# Patient Record
Sex: FEMALE
[De-identification: names, ages, dates, MRNs, and addresses within clinical notes are randomized per-mention and may not be internally consistent; named-entity substitution may affect disease eponyms.]

---

## 2022-02-04 ENCOUNTER — APPOINTMENT (OUTPATIENT)
Dept: UROLOGY | Facility: CLINIC | Age: 57
End: 2022-02-04
Payer: COMMERCIAL

## 2022-02-04 VITALS
DIASTOLIC BLOOD PRESSURE: 86 MMHG | BODY MASS INDEX: 24.11 KG/M2 | HEART RATE: 78 BPM | HEIGHT: 66 IN | SYSTOLIC BLOOD PRESSURE: 129 MMHG | OXYGEN SATURATION: 96 % | WEIGHT: 150 LBS

## 2022-02-04 DIAGNOSIS — Z83.3 FAMILY HISTORY OF DIABETES MELLITUS: ICD-10-CM

## 2022-02-04 DIAGNOSIS — R30.0 DYSURIA: ICD-10-CM

## 2022-02-04 DIAGNOSIS — Z78.9 OTHER SPECIFIED HEALTH STATUS: ICD-10-CM

## 2022-02-04 DIAGNOSIS — Z80.3 FAMILY HISTORY OF MALIGNANT NEOPLASM OF BREAST: ICD-10-CM

## 2022-02-04 PROCEDURE — 99203 OFFICE O/P NEW LOW 30 MIN: CPT

## 2022-02-04 NOTE — HISTORY OF PRESENT ILLNESS
[FreeTextEntry1] : 55 yo F referred for microhematuria\par Went to gyn at the end of Nov for some dysuria\par Pt was told she had microhematuria but no bacteria\par Symptoms resolved and was doing well until last week\par Recurrence of vaginal discomfort and increase in urinary frequency and urgency\par Had episode of urge incontinence which is not usual for her\par No gross hematuria, no prior history of kidney stones\par Drinks 4 bottles of water, sometimes soda\par no children, not sexually active\par LMP = 10 yrs ago\par normal bowel movements\par [facility] - [facility] [place] - [phone]

## 2022-02-04 NOTE — ASSESSMENT
[FreeTextEntry1] : 57 yo F with dysuria, history of microhematuria\par \par - PVR = 0ml\par - Obtain labwork from gyn for review\par - UA, culture\par - Discussed possible etiologies for microhematuria including benign (UTI, nephrolithiasis, cyst, BPH) vs malignancy (renal, ureteral and bladder). Discussed hematuria workup which includes upper tract imaging such as US or CT urogram and cystoscopy. Discussed risk and benefits of cystoscopy.\par - Once microhematuria confirmed with neg culture, will proceed with workup

## 2022-02-11 LAB
APPEARANCE: ABNORMAL
BACTERIA UR CULT: ABNORMAL
BACTERIA: ABNORMAL
BILIRUBIN URINE: NEGATIVE
BLOOD URINE: ABNORMAL
CALCIUM OXALATE CRYSTALS: ABNORMAL
COLOR: ABNORMAL
GLUCOSE QUALITATIVE U: NEGATIVE
HYALINE CASTS: 24 /LPF
KETONES URINE: NEGATIVE
LEUKOCYTE ESTERASE URINE: ABNORMAL
MICROSCOPIC-UA: NORMAL
NITRITE URINE: NEGATIVE
PH URINE: 5.5
PROTEIN URINE: ABNORMAL
RED BLOOD CELLS URINE: 2 /HPF
SPECIFIC GRAVITY URINE: 1.03
SQUAMOUS EPITHELIAL CELLS: 6 /HPF
UROBILINOGEN URINE: NORMAL
WHITE BLOOD CELLS URINE: 15 /HPF

## 2022-03-11 ENCOUNTER — APPOINTMENT (OUTPATIENT)
Dept: UROLOGY | Facility: CLINIC | Age: 57
End: 2022-03-11
Payer: COMMERCIAL

## 2022-03-11 DIAGNOSIS — N39.0 URINARY TRACT INFECTION, SITE NOT SPECIFIED: ICD-10-CM

## 2022-03-11 PROCEDURE — 99213 OFFICE O/P EST LOW 20 MIN: CPT

## 2022-03-11 RX ORDER — SULFAMETHOXAZOLE AND TRIMETHOPRIM 800; 160 MG/1; MG/1
800-160 TABLET ORAL
Qty: 14 | Refills: 0 | Status: COMPLETED | COMMUNITY
Start: 2022-02-11 | End: 2022-03-11

## 2022-03-11 NOTE — HISTORY OF PRESENT ILLNESS
[FreeTextEntry1] : 57 yo F referred for microhematuria\par Went to gyn at the end of Nov for some dysuria\par Pt was told she had microhematuria but no bacteria\par Symptoms resolved and was doing well until last week\par Recurrence of vaginal discomfort and increase in urinary frequency and urgency\par Had episode of urge incontinence which is not usual for her\par No gross hematuria, no prior history of kidney stones\par Drinks 4 bottles of water, sometimes soda\par no children, not sexually active\par LMP = 10 yrs ago\par normal bowel movements\par [facility] - [facility] in [place] - [phone]\par \par 3/11/22 Interval history: Doing well since last visit\par Found to have a UTI and completed abx course\par Feeling much better with resolution of LUTS and vaginal discomfort\par No gross hematuria

## 2022-03-11 NOTE — ASSESSMENT
[FreeTextEntry1] : 55 yo F with microhematuria, hsitory of UTI\par \par - Repeat UA and culture today\par - If UTI or microhematuria persists, will consider renal US with cysto
oral

## 2022-03-11 NOTE — PHYSICAL EXAM

## 2022-03-14 LAB
APPEARANCE: ABNORMAL
BACTERIA UR CULT: NORMAL
BACTERIA: NEGATIVE
BILIRUBIN URINE: NEGATIVE
BLOOD URINE: ABNORMAL
COLOR: YELLOW
GLUCOSE QUALITATIVE U: NEGATIVE
HYALINE CASTS: 5 /LPF
KETONES URINE: ABNORMAL
LEUKOCYTE ESTERASE URINE: ABNORMAL
MICROSCOPIC-UA: NORMAL
NITRITE URINE: NEGATIVE
PH URINE: 6
PROTEIN URINE: ABNORMAL
RED BLOOD CELLS URINE: 7 /HPF
SPECIFIC GRAVITY URINE: 1.03
SQUAMOUS EPITHELIAL CELLS: 2 /HPF
UROBILINOGEN URINE: NORMAL
WHITE BLOOD CELLS URINE: 4 /HPF

## 2022-03-15 ENCOUNTER — NON-APPOINTMENT (OUTPATIENT)
Age: 57
End: 2022-03-15

## 2022-04-06 ENCOUNTER — APPOINTMENT (OUTPATIENT)
Dept: UROLOGY | Facility: CLINIC | Age: 57
End: 2022-04-06
Payer: COMMERCIAL

## 2022-04-06 DIAGNOSIS — R31.9 HEMATURIA, UNSPECIFIED: ICD-10-CM

## 2022-04-06 PROCEDURE — 52000 CYSTOURETHROSCOPY: CPT

## 2022-04-06 PROCEDURE — 76705 ECHO EXAM OF ABDOMEN: CPT

## 2022-05-04 ENCOUNTER — APPOINTMENT (OUTPATIENT)
Dept: PULMONOLOGY | Facility: CLINIC | Age: 57
End: 2022-05-04
Payer: COMMERCIAL

## 2022-05-04 VITALS
BODY MASS INDEX: 25.66 KG/M2 | HEART RATE: 81 BPM | DIASTOLIC BLOOD PRESSURE: 81 MMHG | WEIGHT: 159 LBS | TEMPERATURE: 97.8 F | SYSTOLIC BLOOD PRESSURE: 119 MMHG | OXYGEN SATURATION: 95 %

## 2022-05-04 DIAGNOSIS — R07.89 OTHER CHEST PAIN: ICD-10-CM

## 2022-05-04 DIAGNOSIS — R05.9 COUGH, UNSPECIFIED: ICD-10-CM

## 2022-05-04 PROCEDURE — 71046 X-RAY EXAM CHEST 2 VIEWS: CPT

## 2022-05-04 PROCEDURE — 94010 BREATHING CAPACITY TEST: CPT

## 2022-05-04 PROCEDURE — 99205 OFFICE O/P NEW HI 60 MIN: CPT | Mod: 25

## 2022-05-04 PROCEDURE — 94727 GAS DIL/WSHOT DETER LNG VOL: CPT

## 2022-05-04 PROCEDURE — 88738 HGB QUANT TRANSCUTANEOUS: CPT

## 2022-05-04 PROCEDURE — 94729 DIFFUSING CAPACITY: CPT

## 2022-05-05 LAB — POCT - HEMOGLOBIN (HGB), QUANTITATIVE, TRANSCUTANEOUS: 12.5

## 2022-05-06 NOTE — PROCEDURE
[FreeTextEntry1] : 05/04/2022\par Pulmonary function testing\par FEV1, FVC, and FEV1/FVC are within normal limits. TLC and subdivisions are normal. RV/TLC ratio is normal. Single breath diffusion capacity is normal.

## 2022-05-06 NOTE — HISTORY OF PRESENT ILLNESS
[Never] : never [Awakes Unrefreshed] : awakes unrefreshed [Fatigue] : fatigue [Unintentional Sleep while Inactive] : unintentional sleep while inactive [TextBox_4] : FELIX HOLT is a 56 year old  F referred for pulmonary evaluation for shortness of breath and lower abdominal upper chest discomfort.\par \par Occ cramping upper abd, lower chest bending over.  Has seen GI and had work-up.  Etiology remains unclear.  Desires pulmonary evaluation.\par Denied cough, wheezing.  Associated shortness of breath at times bending over..  Some exertional dyspnea but has not been exercising.\par Positive fatigue.\par ? COVID not ill. \par Vaccinated x 3\par Minimal exercise.\par \par Past pulmonary history. Pneumonia 20's\par Occupational Exposure.N\par Family history of pulmonary disease. N\par Recent travel N\par Pets  N\par \par No recent change in environment. [Snoring] : no snoring [Vivid dreams] : no vivid dreams

## 2022-05-06 NOTE — DISCUSSION/SUMMARY
[FreeTextEntry1] : No clinical functional radiographic evidence of pulmonary dysfunction.\par Discomfort most likely musculoskeletal in origin.  Has seen GI.\par \par

## 2022-05-06 NOTE — ASSESSMENT
[FreeTextEntry1] : Observation.\par Will follow-up with primary care doctor.\par Consider cardiac evaluation.\par \par 65 minutes spent in evaluation management and discussion.

## 2023-04-17 ENCOUNTER — APPOINTMENT (OUTPATIENT)
Dept: ORTHOPEDIC SURGERY | Facility: CLINIC | Age: 58
End: 2023-04-17
Payer: COMMERCIAL

## 2023-04-17 VITALS — WEIGHT: 150 LBS | HEIGHT: 66 IN | BODY MASS INDEX: 24.11 KG/M2

## 2023-04-17 PROCEDURE — 99203 OFFICE O/P NEW LOW 30 MIN: CPT

## 2023-04-17 PROCEDURE — 73080 X-RAY EXAM OF ELBOW: CPT | Mod: LT

## 2023-04-17 NOTE — IMAGING
[de-identified] : Left upper extremity\par Minimal swelling along the lateral elbow\par Tender to palpation at the radial neck\par Forearm nontender to palpation\par ROM 30- 100 degrees\par 30 degrees supination, 20 degrees pronation\par + AIN/PIN/ulnar n\par SILT throughout\par Fingers warm and well perfused\par

## 2023-04-17 NOTE — DISCUSSION/SUMMARY
[Medication Risks Reviewed] : Medication risks reviewed [de-identified] : - Reviewed the nature and prognosis of this injury with the patient\par - Discussed indications for both operative and nonoperative management\par - Instructed on elbow range of motion and weaning from sling\par - No lifting, pushing, pulling greater than 5 pounds\par - NSAIDs prn pain\par - Follow-up in 2 weeks for repeat exam

## 2023-04-17 NOTE — HISTORY OF PRESENT ILLNESS
[8] : 8 [Dull/Aching] : dull/aching [Shooting] : shooting [Throbbing] : throbbing [Constant] : constant [Bending forward] : bending forward [Extending back] : extending back [] : yes [de-identified] : 57-year-old right-hand-dominant female here for evaluation of left elbow pain.  The patient reports that she sustained a fall on 4/16/2023 landing on her outstretched left hand.  She was seen at urgent care where x-rays were obtained and she was placed in a sling with instructions to follow-up as an outpatient.  Today the patient reports continued pain in the left elbow but denies numbness and tingling.  She is retired. [FreeTextEntry5] :  FELIX 57 year F is here for Left Elbow, states (04/16/2023) she fell on her Left Side, she went to urgent care they took x-ray and put her in sling, Pt state the Left arm is sensitive to touch, As well states she needs a MRI for the Left Elbow  [de-identified] : 04/16/2023 [de-identified] : Urgent Care  [de-identified] : x-ray

## 2023-05-01 ENCOUNTER — APPOINTMENT (OUTPATIENT)
Dept: ORTHOPEDIC SURGERY | Facility: CLINIC | Age: 58
End: 2023-05-01
Payer: COMMERCIAL

## 2023-05-01 DIAGNOSIS — S52.133A DISPLACED FRACTURE OF NECK OF UNSPECIFIED RADIUS, INITIAL ENCOUNTER FOR CLOSED FRACTURE: ICD-10-CM

## 2023-05-01 PROCEDURE — 99212 OFFICE O/P EST SF 10 MIN: CPT

## 2023-05-01 NOTE — HISTORY OF PRESENT ILLNESS
[8] : 8 [Dull/Aching] : dull/aching [Shooting] : shooting [Throbbing] : throbbing [Constant] : constant [Bending forward] : bending forward [Extending back] : extending back [] : yes [de-identified] : 57-year-old right-hand-dominant female now 2 weeks s/p left radial neck fracture.  She returns today for routine evaluation.  She notes considerable improvement in her previous pain and swelling.  She has been working on her range of motion as instructed previously.  She has minimal pain in the elbow at this point.  No new complaints today, numbness or tingling.  [FreeTextEntry5] :  FELIX 57 year F is here for Left Elbow, states it's not only the Left Elbow but 'its the Left hand,wrist and forearm is still having discomfort on the Left Elbow   [de-identified] : 04/16/2023 [de-identified] : Urgent Care  [de-identified] : x-ray

## 2023-05-01 NOTE — IMAGING
[de-identified] : Left upper extremity\par No swelling, ecchymosis or deformity\par Mildly tender to palpation at the radial neck\par Forearm nontender to palpation\par ROM 10- 140 degrees\par 60 degrees supination, 60 degrees pronation\par + AIN/PIN/ulnar n\par SILT throughout\par Fingers warm and well perfused\par

## 2023-05-01 NOTE — DISCUSSION/SUMMARY
[Medication Risks Reviewed] : Medication risks reviewed [de-identified] : - Again reviewed the nature and prognosis of this injury with the patient\par - Discussed continued range of motion exercises and gradual return to activities\par - PT prescription provided\par - NSAIDs prn pain\par - Follow-up as needed

## 2024-04-04 ENCOUNTER — APPOINTMENT (OUTPATIENT)
Dept: MRI IMAGING | Facility: CLINIC | Age: 59
End: 2024-04-04
Payer: COMMERCIAL

## 2024-04-04 ENCOUNTER — APPOINTMENT (OUTPATIENT)
Dept: ORTHOPEDIC SURGERY | Facility: CLINIC | Age: 59
End: 2024-04-04
Payer: COMMERCIAL

## 2024-04-04 VITALS — BODY MASS INDEX: 24.11 KG/M2 | WEIGHT: 150 LBS | HEIGHT: 66 IN

## 2024-04-04 DIAGNOSIS — M16.11 UNILATERAL PRIMARY OSTEOARTHRITIS, RIGHT HIP: ICD-10-CM

## 2024-04-04 DIAGNOSIS — M51.36 OTHER INTERVERTEBRAL DISC DEGENERATION, LUMBAR REGION: ICD-10-CM

## 2024-04-04 DIAGNOSIS — S33.5XXA SPRAIN OF LIGAMENTS OF LUMBAR SPINE, INITIAL ENCOUNTER: ICD-10-CM

## 2024-04-04 DIAGNOSIS — F07.81 POSTCONCUSSIONAL SYNDROME: ICD-10-CM

## 2024-04-04 DIAGNOSIS — M54.9 DORSALGIA, UNSPECIFIED: ICD-10-CM

## 2024-04-04 DIAGNOSIS — S29.9XXA UNSPECIFIED INJURY OF THORAX, INITIAL ENCOUNTER: ICD-10-CM

## 2024-04-04 PROCEDURE — 99204 OFFICE O/P NEW MOD 45 MIN: CPT

## 2024-04-04 PROCEDURE — 72170 X-RAY EXAM OF PELVIS: CPT

## 2024-04-04 PROCEDURE — 72146 MRI CHEST SPINE W/O DYE: CPT

## 2024-04-04 PROCEDURE — 72100 X-RAY EXAM L-S SPINE 2/3 VWS: CPT

## 2024-04-04 PROCEDURE — 71100 X-RAY EXAM RIBS UNI 2 VIEWS: CPT | Mod: LT

## 2024-04-04 PROCEDURE — 72070 X-RAY EXAM THORAC SPINE 2VWS: CPT

## 2024-04-04 NOTE — ASSESSMENT
[FreeTextEntry1] : Needs MRI thoracic spine to assess the compression fx, r/o HNP/retropulsion Limit bending/twisting/lifting Deep breathing hourly while awake

## 2024-04-04 NOTE — HISTORY OF PRESENT ILLNESS
[Gradual] : gradual [Dull/Aching] : dull/aching [Frequent] : frequent [Sleep] : sleep [de-identified] :  04/04/2024: She was on a cruise in feb and states she had syncopal episode due to seas sickness causing an unwitnessed fall. since then she reports pain mostly on the left side ribs but also the right ribs and back  she reports that she has continued with dizziness and concussive symptoms  since [] : no [FreeTextEntry1] : left ribs  [FreeTextEntry3] : 2/7/24 [FreeTextEntry5] : patient fainted while on a cruise and she isn't sure how she injured her ribs.

## 2024-04-04 NOTE — PHYSICAL EXAM
[Flexion] : flexion [Extension] : extension [Bending to left] : bending to left [Bending to right] : bending to right [] : negative equivocal straight leg raise [FreeTextEntry8] : left ant/lat rib tenderness

## 2024-04-04 NOTE — IMAGING
[Disc space narrowing] : Disc space narrowing [AP] : anteroposterior [Moderate arthritis (Tonnis Grade 2)] : Moderate arthritis (Tonnis Grade 2) [Bilateral] : rib bilaterally [Fracture] : Fracture [No bony abnormalities] : No bony abnormalities [de-identified] : right hip OA [FreeTextEntry1] : T12 compression fx 30%

## 2024-04-11 ENCOUNTER — APPOINTMENT (OUTPATIENT)
Dept: ORTHOPEDIC SURGERY | Facility: CLINIC | Age: 59
End: 2024-04-11
Payer: COMMERCIAL

## 2024-04-11 ENCOUNTER — RESULT REVIEW (OUTPATIENT)
Age: 59
End: 2024-04-11

## 2024-04-11 VITALS — BODY MASS INDEX: 24.11 KG/M2 | WEIGHT: 150 LBS | HEIGHT: 66 IN

## 2024-04-11 PROCEDURE — 99213 OFFICE O/P EST LOW 20 MIN: CPT

## 2024-04-11 NOTE — ASSESSMENT
[FreeTextEntry1] : MRI reviewed, needs bone densitometry to r/o osteoporosis. Vitamin D3 supplementation discussed. Will start PT in a few weeks

## 2024-04-11 NOTE — PHYSICAL EXAM
[Flexion] : flexion [Extension] : extension [Bending to left] : bending to left [Bending to right] : bending to right [] : mildly antalgic [FreeTextEntry8] : left ant/lat rib tenderness

## 2024-04-11 NOTE — HISTORY OF PRESENT ILLNESS
[Gradual] : gradual [Dull/Aching] : dull/aching [Frequent] : frequent [Sleep] : sleep [de-identified] :  04/04/2024: She was on a cruise in feb and states she had syncopal episode due to seas sickness causing an unwitnessed fall. since then she reports pain mostly on the left side ribs but also the right ribs and back  she reports that she has continued with dizziness and concussive symptoms  since [] : no [FreeTextEntry1] : left ribs  [FreeTextEntry3] : 2/7/24 [FreeTextEntry5] : patient fainted while on a cruise and she isn't sure how she injured her ribs.

## 2024-04-11 NOTE — REASON FOR VISIT
[FreeTextEntry2] : 04/11/2024 Had MRI, showed acute superior end plate compression fx T12, no rettopulsion. Has assorted disc degeneration, bulges, incidental hemangioma T9

## 2024-04-11 NOTE — DATA REVIEWED
[MRI] : MRI [Thoracic Spine] : thoracic spine [I reviewed the films/CD and agree] : I reviewed the films/CD and agree

## 2024-04-18 ENCOUNTER — APPOINTMENT (OUTPATIENT)
Dept: ORTHOPEDIC SURGERY | Facility: CLINIC | Age: 59
End: 2024-04-18
Payer: COMMERCIAL

## 2024-04-18 VITALS — WEIGHT: 150 LBS | BODY MASS INDEX: 24.11 KG/M2 | HEIGHT: 66 IN

## 2024-04-18 PROCEDURE — 99213 OFFICE O/P EST LOW 20 MIN: CPT

## 2024-04-18 NOTE — ASSESSMENT
[FreeTextEntry1] : Referred her to Dr. Meng Fowler for evaluation and treatment of her osteoporosis Vitamin D3 2000 units per day for next 6 weeks. f/u 6 weeks for compression fx

## 2024-04-18 NOTE — DATA REVIEWED
[Bone Density] : bone density [Report was reviewed and noted in the chart] : The report was reviewed and noted in the chart

## 2024-04-18 NOTE — HISTORY OF PRESENT ILLNESS
[Gradual] : gradual [Dull/Aching] : dull/aching [Frequent] : frequent [Sleep] : sleep [de-identified] :  04/04/2024: She was on a cruise in feb and states she had syncopal episode due to seas sickness causing an unwitnessed fall. since then she reports pain mostly on the left side ribs but also the right ribs and back  she reports that she has continued with dizziness and concussive symptoms  since [] : no [FreeTextEntry3] : 2/7/24 [FreeTextEntry1] : left ribs  [FreeTextEntry5] : patient fainted while on a cruise and she isn't sure how she injured her ribs.

## 2024-04-18 NOTE — REASON FOR VISIT
[FreeTextEntry2] : 04/18/2024 Had bone density: c/w osteoporosis, worst in lumbar spine 04/11/2024 Had MRI, showed acute superior end plate compression fx T12, no rettopulsion. Has assorted disc degeneration, bulges, incidental hemangioma T9

## 2024-04-20 ENCOUNTER — TRANSCRIPTION ENCOUNTER (OUTPATIENT)
Age: 59
End: 2024-04-20

## 2024-06-10 ENCOUNTER — APPOINTMENT (OUTPATIENT)
Dept: ORTHOPEDIC SURGERY | Facility: CLINIC | Age: 59
End: 2024-06-10
Payer: COMMERCIAL

## 2024-06-10 VITALS — HEIGHT: 66 IN | BODY MASS INDEX: 24.11 KG/M2 | WEIGHT: 150 LBS

## 2024-06-10 DIAGNOSIS — S22.080A WEDGE COMPRESSION FRACTURE OF T11-T12 VERTEBRA, INITIAL ENCOUNTER FOR CLOSED FRACTURE: ICD-10-CM

## 2024-06-10 DIAGNOSIS — M80.00XD AGE-RELATED OSTEOPOROSIS WITH CURRENT PATHOLOGICAL FRACTURE, UNSPECIFIED SITE, SUBSEQUENT ENCOUNTER FOR FRACTURE WITH ROUTINE HEALING: ICD-10-CM

## 2024-06-10 PROCEDURE — 72100 X-RAY EXAM L-S SPINE 2/3 VWS: CPT

## 2024-06-10 PROCEDURE — 99213 OFFICE O/P EST LOW 20 MIN: CPT

## 2024-06-10 PROCEDURE — 72070 X-RAY EXAM THORAC SPINE 2VWS: CPT

## 2024-06-10 NOTE — HISTORY OF PRESENT ILLNESS
[Gradual] : gradual [Dull/Aching] : dull/aching [Frequent] : frequent [Sleep] : sleep [de-identified] :  06/10/2024: two months s/p t spine compression fracture for f/u xrays    04/04/2024: She was on a cruise in feb and states she had syncopal episode due to seas sickness causing an unwitnessed fall. since then she reports pain mostly on the left side ribs but also the right ribs and back  she reports that she has continued with dizziness and concussive symptoms  since [] : no [FreeTextEntry1] : left ribs  [FreeTextEntry3] : 2/7/24 [FreeTextEntry5] : patient fainted while on a cruise and she isn't sure how she injured her ribs.

## 2024-06-10 NOTE — REASON FOR VISIT
[FreeTextEntry2] :  06/10/2024: for f/u xrays, feeling well, no back pain 04/18/2024 Had bone density: c/w osteoporosis, worst in lumbar spine 04/11/2024 Had MRI, showed acute superior end plate compression fx T12, no rettopulsion. Has assorted disc degeneration, bulges, incidental hemangioma T9